# Patient Record
Sex: FEMALE | Race: BLACK OR AFRICAN AMERICAN
[De-identification: names, ages, dates, MRNs, and addresses within clinical notes are randomized per-mention and may not be internally consistent; named-entity substitution may affect disease eponyms.]

---

## 2024-09-05 ENCOUNTER — NON-APPOINTMENT (OUTPATIENT)
Age: 24
End: 2024-09-05

## 2024-09-12 PROBLEM — Z00.00 ENCOUNTER FOR PREVENTIVE HEALTH EXAMINATION: Status: ACTIVE | Noted: 2024-09-12

## 2024-09-14 ENCOUNTER — APPOINTMENT (OUTPATIENT)
Dept: ORTHOPEDIC SURGERY | Facility: CLINIC | Age: 24
End: 2024-09-14
Payer: COMMERCIAL

## 2024-09-14 VITALS
OXYGEN SATURATION: 95 % | WEIGHT: 243 LBS | HEIGHT: 68 IN | SYSTOLIC BLOOD PRESSURE: 101 MMHG | DIASTOLIC BLOOD PRESSURE: 67 MMHG | BODY MASS INDEX: 36.83 KG/M2 | HEART RATE: 108 BPM

## 2024-09-14 DIAGNOSIS — M79.641 PAIN IN RIGHT HAND: ICD-10-CM

## 2024-09-14 DIAGNOSIS — M65.9 SYNOVITIS AND TENOSYNOVITIS, UNSPECIFIED: ICD-10-CM

## 2024-09-14 PROCEDURE — 99203 OFFICE O/P NEW LOW 30 MIN: CPT

## 2024-09-14 PROCEDURE — 73130 X-RAY EXAM OF HAND: CPT | Mod: RT

## 2024-09-14 RX ORDER — MELOXICAM 15 MG/1
15 TABLET ORAL
Qty: 30 | Refills: 0 | Status: ACTIVE | COMMUNITY
Start: 2024-09-14 | End: 1900-01-01

## 2024-09-14 NOTE — PHYSICAL EXAM
[de-identified] : GENERAL APPEARANCE: Well nourished and hydrated, pleasant, alert, and oriented x 3. Appears their stated age.  HEENT: Normocephalic, extraocular eye motion intact. Nasal septum midline. Oral cavity clear. External auditory canal clear.  RESPIRATORY: Breath sounds clear and audible in all lobes. No wheezing, No accessory muscle use. CARDIOVASCULAR: No apparent abnormalities. No lower leg edema. No varicosities. Pedal pulses are palpable. NEUROLOGIC: Sensation is normal, no muscle weakness in the upper or lower extremities. DERMATOLOGIC: No apparent skin lesions, moist, warm, no rash. SPINE: Cervical spine appears normal and moves freely; thoracic spine appears normal and moves freely; lumbosacral spine appears normal and moves freely, normal, nontender. MUSCULOSKELETAL: Hands, wrists, and elbows are normal and move freely, shoulders are normal and move freely.  PSYCHIATRIC: Oriented to person, place, and time, insight and judgement were intact and the affect was normal.   RIGHT hand exam shows no visible swelling, FPL and first MCP tenderness to palpation, no thenar atrophy, full thumb flexion at MCP joint, full thumb finger flexion at IP joint, full extension, flexion, abduction and opposition at the MCP joint.  No gross instability.  From strength: 5/5 Flexion with pain, 5/5 Extension, thumb opposition strength 5 out of 5 with pain Left hand demonstrates full, pain-free, stable ROM and strength.  [de-identified] : X-rays were obtained today the patient's right hand, AP, lateral, oblique views.  There is no fracture or dislocation.  No significant arthritic change. There is a small area of sclerosis noted at the head of the first metacarpal.

## 2024-09-14 NOTE — DISCUSSION/SUMMARY
[] : left [FreeTextEntry1] : Assessment: 23-year-old female with right thumb pain consistent with a flexor tenosynovitis of the FPL tendon. She denies any clinical signs or symptoms of a stenosing tenosynovitis. No evidence of UCL instability noted on today's exam Treatment options were reviewed.  Treatment: 1. Meloxicam 15 mg taken once daily x 2 weeks.  Then as needed. 2.  Continued use of her functional thumb spica splint while active.  She may wean from the use of this as her pain improves. 3.  She will follow-up with our hand specialist in 1 month should her pain persist.  Otherwise, as needed. She was on board with the plan.  All questions answered.

## 2024-09-14 NOTE — HISTORY OF PRESENT ILLNESS
[FreeTextEntry1] : 9/14/2024: Tiffany is a 23-year-old right-hand-dominant female that presents today for initial evaluation of right hand/thumb pain which began approximate 2 to 3 weeks ago.  No specific injury. She presents today with a chief complaint of intermittent, moderate dull aching pain over the palmar aspect of the thumb, most pronounced at the first MCP joint.  Her symptoms are exacerbated with gripping and thumb opposition.  She denies any complaints of mechanical locking, catching or instability.  No radicular pain or paresthesia. No previous history of right thumb pain in the past. She was recently seen at [facility] urgent care.  She does have a functional thumb spica splint and has been taking Toradol orally intermittently.  She has noted mild improvement of symptoms.

## 2024-10-22 ENCOUNTER — APPOINTMENT (OUTPATIENT)
Dept: ORTHOPEDIC SURGERY | Facility: CLINIC | Age: 24
End: 2024-10-22